# Patient Record
Sex: FEMALE | Race: WHITE | NOT HISPANIC OR LATINO | Employment: FULL TIME | URBAN - METROPOLITAN AREA
[De-identification: names, ages, dates, MRNs, and addresses within clinical notes are randomized per-mention and may not be internally consistent; named-entity substitution may affect disease eponyms.]

---

## 2024-10-12 ENCOUNTER — HOSPITAL ENCOUNTER (EMERGENCY)
Facility: HOSPITAL | Age: 49
Discharge: HOME | End: 2024-10-12
Attending: EMERGENCY MEDICINE
Payer: COMMERCIAL

## 2024-10-12 ENCOUNTER — APPOINTMENT (OUTPATIENT)
Dept: GENERAL RADIOLOGY | Facility: HOSPITAL | Age: 49
End: 2024-10-12
Payer: COMMERCIAL

## 2024-10-12 VITALS
SYSTOLIC BLOOD PRESSURE: 119 MMHG | HEART RATE: 93 BPM | WEIGHT: 195 LBS | BODY MASS INDEX: 33.29 KG/M2 | OXYGEN SATURATION: 98 % | DIASTOLIC BLOOD PRESSURE: 108 MMHG | TEMPERATURE: 97.9 F | HEIGHT: 64 IN | RESPIRATION RATE: 22 BRPM

## 2024-10-12 DIAGNOSIS — Z86.79 HISTORY OF HYPERTENSION: ICD-10-CM

## 2024-10-12 DIAGNOSIS — I47.10 PAROXYSMAL SVT (SUPRAVENTRICULAR TACHYCARDIA): Primary | ICD-10-CM

## 2024-10-12 DIAGNOSIS — E11.65 TYPE 2 DIABETES MELLITUS WITH HYPERGLYCEMIA, UNSPECIFIED WHETHER LONG TERM INSULIN USE: ICD-10-CM

## 2024-10-12 LAB
ALBUMIN SERPL-MCNC: 4.5 G/DL (ref 3.5–5.2)
ALBUMIN/GLOB SERPL: 1.4 G/DL
ALP SERPL-CCNC: 97 U/L (ref 39–117)
ALT SERPL W P-5'-P-CCNC: 31 U/L (ref 1–33)
ANION GAP SERPL CALCULATED.3IONS-SCNC: 18 MMOL/L (ref 5–15)
AST SERPL-CCNC: 27 U/L (ref 1–32)
BASOPHILS # BLD AUTO: 0.04 10*3/MM3 (ref 0–0.2)
BASOPHILS NFR BLD AUTO: 0.2 % (ref 0–1.5)
BILIRUB SERPL-MCNC: 0.3 MG/DL (ref 0–1.2)
BUN SERPL-MCNC: 14 MG/DL (ref 6–20)
BUN/CREAT SERPL: 16.9 (ref 7–25)
CALCIUM SPEC-SCNC: 9.5 MG/DL (ref 8.6–10.5)
CHLORIDE SERPL-SCNC: 104 MMOL/L (ref 98–107)
CO2 SERPL-SCNC: 23 MMOL/L (ref 22–29)
CREAT SERPL-MCNC: 0.83 MG/DL (ref 0.57–1)
DEPRECATED RDW RBC AUTO: 40.3 FL (ref 37–54)
EGFRCR SERPLBLD CKD-EPI 2021: 86.5 ML/MIN/1.73
EOSINOPHIL # BLD AUTO: 0.09 10*3/MM3 (ref 0–0.4)
EOSINOPHIL NFR BLD AUTO: 0.5 % (ref 0.3–6.2)
ERYTHROCYTE [DISTWIDTH] IN BLOOD BY AUTOMATED COUNT: 11.9 % (ref 12.3–15.4)
GLOBULIN UR ELPH-MCNC: 3.3 GM/DL
GLUCOSE SERPL-MCNC: 166 MG/DL (ref 65–99)
HCT VFR BLD AUTO: 46.3 % (ref 34–46.6)
HGB BLD-MCNC: 15.5 G/DL (ref 12–15.9)
HOLD SPECIMEN: NORMAL
IMM GRANULOCYTES # BLD AUTO: 0.06 10*3/MM3 (ref 0–0.05)
IMM GRANULOCYTES NFR BLD AUTO: 0.3 % (ref 0–0.5)
LIPASE SERPL-CCNC: 40 U/L (ref 13–60)
LYMPHOCYTES # BLD AUTO: 3.57 10*3/MM3 (ref 0.7–3.1)
LYMPHOCYTES NFR BLD AUTO: 20.6 % (ref 19.6–45.3)
MCH RBC QN AUTO: 30.8 PG (ref 26.6–33)
MCHC RBC AUTO-ENTMCNC: 33.5 G/DL (ref 31.5–35.7)
MCV RBC AUTO: 91.9 FL (ref 79–97)
MONOCYTES # BLD AUTO: 0.96 10*3/MM3 (ref 0.1–0.9)
MONOCYTES NFR BLD AUTO: 5.5 % (ref 5–12)
NEUTROPHILS NFR BLD AUTO: 12.63 10*3/MM3 (ref 1.7–7)
NEUTROPHILS NFR BLD AUTO: 72.9 % (ref 42.7–76)
NRBC BLD AUTO-RTO: 0 /100 WBC (ref 0–0.2)
NT-PROBNP SERPL-MCNC: 79.5 PG/ML (ref 0–450)
PLATELET # BLD AUTO: 335 10*3/MM3 (ref 140–450)
PMV BLD AUTO: 8.7 FL (ref 6–12)
POTASSIUM SERPL-SCNC: 4.2 MMOL/L (ref 3.5–5.2)
PROT SERPL-MCNC: 7.8 G/DL (ref 6–8.5)
RBC # BLD AUTO: 5.04 10*6/MM3 (ref 3.77–5.28)
SODIUM SERPL-SCNC: 145 MMOL/L (ref 136–145)
TROPONIN T SERPL HS-MCNC: 9 NG/L
WBC NRBC COR # BLD AUTO: 17.35 10*3/MM3 (ref 3.4–10.8)
WHOLE BLOOD HOLD COAG: NORMAL
WHOLE BLOOD HOLD SPECIMEN: NORMAL

## 2024-10-12 PROCEDURE — 80053 COMPREHEN METABOLIC PANEL: CPT | Performed by: EMERGENCY MEDICINE

## 2024-10-12 PROCEDURE — 99285 EMERGENCY DEPT VISIT HI MDM: CPT

## 2024-10-12 PROCEDURE — 93005 ELECTROCARDIOGRAM TRACING: CPT | Performed by: EMERGENCY MEDICINE

## 2024-10-12 PROCEDURE — 84484 ASSAY OF TROPONIN QUANT: CPT | Performed by: EMERGENCY MEDICINE

## 2024-10-12 PROCEDURE — 71045 X-RAY EXAM CHEST 1 VIEW: CPT

## 2024-10-12 PROCEDURE — 83690 ASSAY OF LIPASE: CPT | Performed by: EMERGENCY MEDICINE

## 2024-10-12 PROCEDURE — 96374 THER/PROPH/DIAG INJ IV PUSH: CPT

## 2024-10-12 PROCEDURE — 85025 COMPLETE CBC W/AUTO DIFF WBC: CPT | Performed by: EMERGENCY MEDICINE

## 2024-10-12 PROCEDURE — 83880 ASSAY OF NATRIURETIC PEPTIDE: CPT | Performed by: EMERGENCY MEDICINE

## 2024-10-12 RX ORDER — DILTIAZEM HYDROCHLORIDE 5 MG/ML
20 INJECTION INTRAVENOUS ONCE
Status: COMPLETED | OUTPATIENT
Start: 2024-10-12 | End: 2024-10-12

## 2024-10-12 RX ORDER — DILTIAZEM HYDROCHLORIDE 5 MG/ML
INJECTION INTRAVENOUS
Status: COMPLETED
Start: 2024-10-12 | End: 2024-10-12

## 2024-10-12 RX ORDER — ASPIRIN 81 MG/1
324 TABLET, CHEWABLE ORAL ONCE
Status: DISCONTINUED | OUTPATIENT
Start: 2024-10-12 | End: 2024-10-12 | Stop reason: HOSPADM

## 2024-10-12 RX ORDER — SODIUM CHLORIDE 0.9 % (FLUSH) 0.9 %
10 SYRINGE (ML) INJECTION AS NEEDED
Status: DISCONTINUED | OUTPATIENT
Start: 2024-10-12 | End: 2024-10-12 | Stop reason: HOSPADM

## 2024-10-12 RX ADMIN — DILTIAZEM HYDROCHLORIDE 25 MG: 5 INJECTION INTRAVENOUS at 17:33

## 2024-10-12 RX ADMIN — DILTIAZEM HYDROCHLORIDE 25 MG: 5 INJECTION, SOLUTION INTRAVENOUS at 17:33

## 2024-10-12 NOTE — ED PROVIDER NOTES
Subjective   History of Present Illness  Patient is a 49-year-old female presenting to the emergency department with palpitation and chest tightness starting around 2 PM today.  Patient does report she had an episode of SVT in the past with pregnancy.  Patient states she has been on metoprolol since that time.  Patient reports the symptoms started suddenly around 2 PM today.  Patient denies any history of any other cardiac disease.    History provided by:  Patient and relative      Review of Systems    Past Medical History:   Diagnosis Date    Diabetes mellitus     Hypertension        No Known Allergies    Past Surgical History:   Procedure Laterality Date    UTERINE FIBROID SURGERY         No family history on file.    Social History     Socioeconomic History    Marital status:    Tobacco Use    Smoking status: Never   Substance and Sexual Activity    Alcohol use: No    Drug use: No    Sexual activity: Defer           Objective   Physical Exam  Vitals and nursing note reviewed.   Constitutional:       Appearance: She is well-developed. She is ill-appearing and diaphoretic.   Cardiovascular:      Rate and Rhythm: Regular rhythm. Tachycardia present.   Pulmonary:      Effort: Pulmonary effort is normal. No respiratory distress.   Neurological:      Mental Status: She is alert and oriented to person, place, and time.   Psychiatric:         Mood and Affect: Mood normal.         Behavior: Behavior normal.         Chemical Cardioversion    Date/Time: 10/12/2024 5:38 PM    Performed by: Kavon Wilburn MD  Authorized by: Kavon Wilburn MD    Consent:     Consent obtained:  Emergent situation and verbal    Consent given by:  Patient    Risks discussed:  Death, induced arrhythmia and pain    Alternatives discussed:  Alternative treatment  Pre-procedure details:     Cardioversion basis:  Emergent    Rhythm:  Supraventricular tachycardia  Attempt one:     : diltiazem.      Medication outcome:  Conversion  to normal sinus rhythm: 20mg.  Post-procedure details:     Patient status:  Awake    Patient tolerance of procedure:  Tolerated well, no immediate complications  Critical Care    Performed by: Kavon Wilburn MD  Authorized by: Kavon Wilburn MD    Critical care provider statement:     Critical care time (minutes):  35    Critical care was necessary to treat or prevent imminent or life-threatening deterioration of the following conditions:  Cardiac failure and circulatory failure    Critical care was time spent personally by me on the following activities:  Evaluation of patient's response to treatment, examination of patient, obtaining history from patient or surrogate, ordering and performing treatments and interventions, ordering and review of laboratory studies, ordering and review of radiographic studies and re-evaluation of patient's condition             ED Course  ED Course as of 10/12/24 2352   Sat Oct 12, 2024   1730 Heart Rate(!): 200  Triage heart rate of 200 which is confirmed by the initial EKG which demonstrates an SVT at approximately 198 bpm. [RS]   1737 Successful chemical cardioversion with 20 mg of diltiazem.  See procedure note for details. [RS]   1740 Patient reports feeling much better.  She is resting comfortably. [RS]   1744 WBC(!): 17.35  Leukocytosis noted no likely representing stress response. [RS]   1829 XR Chest 1 View  Personally reviewed single view the chest.  My interpretation there is no focal lobar infiltrate visualized. [RS]   1833 High Sensitivity Troponin T  Negative troponin [RS]   1834 Patient presents with SVT which was resolved with chemical cardioversion with diltiazem.  Patient is tolerated well.  She is resting comfortably.  No overt distress.  Will discharge the patient and have her follow-up with the rhythm clinic for further evaluation and management. [RS]   1834   No evidence of acute coronary syndrome.  No evidence of persistent arrhythmia.  Patient  voiced understanding and agrees. I have reviewed results, considerations, and diagnosis with the patient and/or their representative. Anticipatory guidance provided. Follow-up plan reviewed. Precautions for acute return for re-evaluations also reviewed. This including potential for worsening of the presenting condition and need for further evaluation, admission, and/or intervention as indicated. Opportunity to as questions provided. I advised them to return for any concerns and stressed the importance of timely follow-up and outpatient services. They verbalized understanding.   [RS]   1834 Note to patient: The 21st Century Cures Act makes medical notes like these available to patients in the interest of transparency. However, be advised this is a medical document. It is intended as peer to peer communication. It is written in medical language and may contain abbreviations or verbiage that are unfamiliar. It may appear blunt or direct. Medical documents are intended to carry relevant information, facts as evident, and the clinical opinion of the physician/NPP.   [RS]      ED Course User Index  [RS] Kavon Wilburn MD                                             Medical Decision Making  Problems Addressed:  History of hypertension: complicated acute illness or injury  Paroxysmal SVT (supraventricular tachycardia): complicated acute illness or injury  Type 2 diabetes mellitus with hyperglycemia, unspecified whether long term insulin use: complicated acute illness or injury    Amount and/or Complexity of Data Reviewed  Labs: ordered. Decision-making details documented in ED Course.  Radiology: ordered. Decision-making details documented in ED Course.  ECG/medicine tests: ordered.    Risk  Prescription drug management.    Critical Care  Total time providing critical care: 35 minutes        Final diagnoses:   Paroxysmal SVT (supraventricular tachycardia)   History of hypertension   Type 2 diabetes mellitus with  hyperglycemia, unspecified whether long term insulin use       ED Disposition  ED Disposition       ED Disposition   Discharge    Condition   Stable    Comment   --               [facility] CARDIOLOGY  [address]  [address]  [place] [address] [phone]    As soon as possible.    Joanne Bangura MD  [address] [address] [phone]          [facility] EMERGENCY DEPARTMENT  [address] [address] [phone]    As needed, If symptoms worsen or ANY concerns.         Medication List      No changes were made to your prescriptions during this visit.            Kavon Wilburn MD  10/12/24 1462

## 2024-10-13 LAB
QT INTERVAL: 232 MS
QTC INTERVAL: 421 MS

## 2024-10-17 ENCOUNTER — LAB (OUTPATIENT)
Dept: LAB | Facility: HOSPITAL | Age: 49
End: 2024-10-17
Payer: COMMERCIAL

## 2024-10-17 ENCOUNTER — HOSPITAL ENCOUNTER (OUTPATIENT)
Dept: CARDIOLOGY | Facility: HOSPITAL | Age: 49
Discharge: HOME | End: 2024-10-17
Payer: COMMERCIAL

## 2024-10-17 ENCOUNTER — OFFICE VISIT (OUTPATIENT)
Dept: CARDIOLOGY | Facility: HOSPITAL | Age: 49
End: 2024-10-17
Payer: COMMERCIAL

## 2024-10-17 VITALS
BODY MASS INDEX: 33.19 KG/M2 | RESPIRATION RATE: 18 BRPM | HEIGHT: 64 IN | HEART RATE: 70 BPM | DIASTOLIC BLOOD PRESSURE: 81 MMHG | SYSTOLIC BLOOD PRESSURE: 138 MMHG | WEIGHT: 194.38 LBS | OXYGEN SATURATION: 97 %

## 2024-10-17 DIAGNOSIS — I47.10 PAROXYSMAL SVT (SUPRAVENTRICULAR TACHYCARDIA): ICD-10-CM

## 2024-10-17 DIAGNOSIS — I47.10 PAROXYSMAL SVT (SUPRAVENTRICULAR TACHYCARDIA): Primary | ICD-10-CM

## 2024-10-17 LAB
MAGNESIUM SERPL-MCNC: 2.5 MG/DL (ref 1.6–2.6)
QT INTERVAL: 404 MS
QTC INTERVAL: 445 MS

## 2024-10-17 PROCEDURE — 83735 ASSAY OF MAGNESIUM: CPT

## 2024-10-17 PROCEDURE — 93005 ELECTROCARDIOGRAM TRACING: CPT | Performed by: NURSE PRACTITIONER

## 2024-10-17 PROCEDURE — 93246 EXT ECG>7D<15D RECORDING: CPT

## 2024-10-17 PROCEDURE — 36415 COLL VENOUS BLD VENIPUNCTURE: CPT

## 2024-10-17 RX ORDER — DULAGLUTIDE 0.75 MG/.5ML
INJECTION, SOLUTION SUBCUTANEOUS
COMMUNITY

## 2024-10-17 RX ORDER — METOPROLOL SUCCINATE 50 MG/1
50 TABLET, EXTENDED RELEASE ORAL DAILY
Qty: 90 TABLET | Refills: 1 | Status: SHIPPED | OUTPATIENT
Start: 2024-10-17

## 2024-10-17 RX ORDER — CITALOPRAM HYDROBROMIDE 20 MG/1
20 TABLET ORAL DAILY
COMMUNITY

## 2024-10-17 RX ORDER — BUPROPION HYDROCHLORIDE 300 MG/1
300 TABLET ORAL EVERY MORNING
COMMUNITY

## 2024-10-17 NOTE — PROGRESS NOTES
"Chief Complaint  Establish Care and Rapid Heart Rate (svt)    Subjective      History of Present Illness {CC  Problem List  Visit  Diagnosis   Encounters  Notes  Medications  Labs  Result Review Imaging  Media :23}     Rabia EISENBERG, 49 y.o. female with paroxysmal SVT presents to [facility] for Establish Care and Rapid Heart Rate (svt).    Patient recently evaluated at [facility] emergency department and found to have SVT; successful conversion to NSR with diltiazem.     Presents today with no recurrent arrhythmia symptoms since ED evaluation. Prior symptoms of SVT with rapid heartbeat, flushed sensation. Total duration of SVT approximately 3 hours. Reports prior episode of SVT during childbirth at the age of 31yo. Previously maintained on metoprolol tartrate 25mg every morning; one episode last year with flushed sensation and rapid heartbeat that lasted one hour. No recent holter and TTE, possibly completed during original episode 19y ago. No recent illness or medication change. Minimal caffeine, rare alcohol once per month. No thyroid dysfunction, no sleep apnea symptoms.       Objective     Vital Signs:   Vitals:    10/17/24 0926 10/17/24 0928   BP: 146/82 138/81   BP Location: Left arm Left arm   Patient Position: Standing Sitting   Cuff Size: Adult Adult   Pulse: 69 70   Resp:  18   SpO2: 97% 97%   Weight:  88.2 kg (194 lb 6 oz)   Height:  162.6 cm (64\")     Body mass index is 33.36 kg/m².  Physical Exam  Vitals and nursing note reviewed.   Constitutional:       Appearance: Normal appearance.   HENT:      Head: Normocephalic.   Eyes:      Extraocular Movements: Extraocular movements intact.   Neck:      Vascular: No carotid bruit.   Cardiovascular:      Rate and Rhythm: Normal rate and regular rhythm.      Pulses: Normal pulses.      Heart sounds: Normal heart sounds, S1 normal and S2 normal. No murmur heard.  Pulmonary:      Effort: Pulmonary effort is normal. " No respiratory distress.      Breath sounds: Normal breath sounds.   Musculoskeletal:      Cervical back: Neck supple.      Right lower leg: No edema.      Left lower leg: No edema.   Skin:     General: Skin is warm and dry.   Neurological:      General: No focal deficit present.      Mental Status: She is alert.   Psychiatric:         Mood and Affect: Mood normal.         Behavior: Behavior normal.         Thought Content: Thought content normal.        Data Reviewed:{ Labs  Result Review  Imaging  Med Tab  Media :23}     BNP (10/12/2024 17:34)  Comprehensive Metabolic Panel (10/12/2024 17:34)  CBC & Differential (10/12/2024 17:34)  High Sensitivity Troponin T (10/12/2024 17:34)  XR Chest 1 View (10/12/2024 18:02)   ECG 12 Lead ED Triage Standing Order; Chest Pain (10/12/2024 17:26)   TSH (09/04/2024 11:10)  T4, FREE (09/04/2024 11:10)      Assessment & Plan   Assessment and Plan {CC Problem List  Visit Diagnosis  ROS  Review (Popup)  Health Maintenance  Quality  BestPractice  Medications  SmartSets  SnapShot Encounters  Media :23}       1. Paroxysmal SVT (supraventricular tachycardia)  -Recent recurrent SVT requiring ED evaluation and medical cardioversion. Total duration of SVT approximately 3 hours. Reports prior episode of SVT during childbirth at the age of 31yo.   - ECG today with normal sinus rhythm  -No recurrent SVT symptoms since ED evaluation  -Given recurrent SVT will increase Toprol-XL to 25 Mg daily  -TTE for structural/functional evaluation  -14-day Holter monitor for arrhythmia surveillance prior to establishing electrophysiology  -Discussed SVT treatment options, interested in pursuing potential ablation given her recurrent episodes  -Magnesium level not drawn in ED, will check today  - Ambulatory Referral to Cardiac Electrophysiology      Follow Up {Instructions Charge Capture  Follow-up Communications :23}     Return if symptoms worsen or fail to improve.      Patient was given  instructions and counseling regarding her condition or for health maintenance advice. Please see specific information pulled into the AVS if appropriate.  Patient was instructed to call the Heart [facility] Center with any questions, concerns, or worsening symptoms.    Dictated Utilizing Dragon Dictation   Please note that portions of this note were completed with a voice recognition program.   Part of this note may be an electronic transcription/translation of spoken language to printed text using the Dragon Dictation System.

## 2024-10-17 NOTE — PROGRESS NOTES
[facility] Heart Monitor Documentation    Rabia EISENBERG  1975  [id number]  10/17/24      [] ZIO XT Patch  Model [id number] Prescribed for 14 Days    Serial Number: (N + 9 Digits) [id number]  Apply-By Date on Box: 132715  USPS Tracking Number: [id number]  USPS Tracking        [] [employer] MINI PLUS Mobile Cardiac Telemetry  Model BGMINIPLUS Prescribed for N/A Days    Serial Number: (BGM + 7 Digits) BGM  Shipped-By Date on Box:   UPS Tracking Number: [id number]  UPS Tracking      [] [employer] MINI Holter Monitor  Model BGMINIEL Prescribed for N/A Days    Serial Number: (7 Digits)   Shipped-By Date on Box:   UPS Tracking Number: [id number]  UPS Tracking        This monitor was applied to the patient's chest and checked for proper functioning.  Ms. Rabia EISENBERG was instructed in the proper use of this monitor.  She was given the opportunity to ask questions and left the office with the device 's instruction manual.    Jazmine Goode MA, 10:08 EDT, 10/17/24                  [facility] 8.8.2019

## 2024-10-17 NOTE — PATIENT INSTRUCTIONS
- Lab work on 7th floor  - Heart monitor for 2 weeks   - Office will schedule testing  - Office will call or send message with testing results  - Electrophysiology department will call for appointment

## 2024-10-23 ENCOUNTER — HOSPITAL ENCOUNTER (OUTPATIENT)
Dept: CARDIOLOGY | Facility: HOSPITAL | Age: 49
Discharge: HOME | End: 2024-10-23
Admitting: NURSE PRACTITIONER
Payer: COMMERCIAL

## 2024-10-23 DIAGNOSIS — I47.10 PAROXYSMAL SVT (SUPRAVENTRICULAR TACHYCARDIA): ICD-10-CM

## 2024-10-23 LAB
BH CV ECHO MEAS - AO MAX PG: 4.7 MMHG
BH CV ECHO MEAS - AO MEAN PG: 2 MMHG
BH CV ECHO MEAS - AO ROOT DIAM: 2.45 CM
BH CV ECHO MEAS - AO V2 MAX: 108 CM/SEC
BH CV ECHO MEAS - AO V2 VTI: 19.1 CM
BH CV ECHO MEAS - AVA(I,D): 2.32 CM2
BH CV ECHO MEAS - EDV(CUBED): 64 ML
BH CV ECHO MEAS - EDV(MOD-SP2): 44.2 ML
BH CV ECHO MEAS - EDV(MOD-SP4): 60.5 ML
BH CV ECHO MEAS - EF(MOD-BP): 61.9 %
BH CV ECHO MEAS - EF(MOD-SP2): 58.1 %
BH CV ECHO MEAS - EF(MOD-SP4): 61.3 %
BH CV ECHO MEAS - ESV(CUBED): 15.9 ML
BH CV ECHO MEAS - ESV(MOD-SP2): 18.5 ML
BH CV ECHO MEAS - ESV(MOD-SP4): 23.4 ML
BH CV ECHO MEAS - FS: 37.1 %
BH CV ECHO MEAS - IVS/LVPW: 1 CM
BH CV ECHO MEAS - IVSD: 0.9 CM
BH CV ECHO MEAS - LA DIMENSION: 3.5 CM
BH CV ECHO MEAS - LAT PEAK E' VEL: 8.9 CM/SEC
BH CV ECHO MEAS - LV MASS(C)D: 109.7 GRAMS
BH CV ECHO MEAS - LV MAX PG: 2.5 MMHG
BH CV ECHO MEAS - LV MEAN PG: 1 MMHG
BH CV ECHO MEAS - LV V1 MAX: 79.4 CM/SEC
BH CV ECHO MEAS - LV V1 VTI: 14.4 CM
BH CV ECHO MEAS - LVIDD: 4 CM
BH CV ECHO MEAS - LVIDS: 2.5 CM
BH CV ECHO MEAS - LVOT AREA: 3.1 CM2
BH CV ECHO MEAS - LVOT DIAM: 1.98 CM
BH CV ECHO MEAS - LVPWD: 0.9 CM
BH CV ECHO MEAS - MED PEAK E' VEL: 9 CM/SEC
BH CV ECHO MEAS - MV A MAX VEL: 90 CM/SEC
BH CV ECHO MEAS - MV DEC SLOPE: 535.1 CM/SEC2
BH CV ECHO MEAS - MV DEC TIME: 0.19 SEC
BH CV ECHO MEAS - MV E MAX VEL: 90 CM/SEC
BH CV ECHO MEAS - MV E/A: 1
BH CV ECHO MEAS - MV MAX PG: 4.3 MMHG
BH CV ECHO MEAS - MV MEAN PG: 1.53 MMHG
BH CV ECHO MEAS - MV P1/2T: 57.9 MSEC
BH CV ECHO MEAS - MV V2 VTI: 29 CM
BH CV ECHO MEAS - MVA(P1/2T): 3.8 CM2
BH CV ECHO MEAS - MVA(VTI): 1.53 CM2
BH CV ECHO MEAS - PA ACC TIME: 0.13 SEC
BH CV ECHO MEAS - PI END-D VEL: 88.2 CM/SEC
BH CV ECHO MEAS - RAP SYSTOLE: 3 MMHG
BH CV ECHO MEAS - RVSP: 17.1 MMHG
BH CV ECHO MEAS - SV(LVOT): 44.3 ML
BH CV ECHO MEAS - SV(MOD-SP2): 25.7 ML
BH CV ECHO MEAS - SV(MOD-SP4): 37.1 ML
BH CV ECHO MEAS - TAPSE (>1.6): 2.46 CM
BH CV ECHO MEAS - TR MAX PG: 14.1 MMHG
BH CV ECHO MEAS - TR MAX VEL: 185.3 CM/SEC
BH CV ECHO MEASUREMENTS AVERAGE E/E' RATIO: 10.06
BH CV XLRA - RV BASE: 3.2 CM
BH CV XLRA - RV LENGTH: 5.4 CM
BH CV XLRA - RV MID: 2.8 CM
BH CV XLRA - TDI S': 12.7 CM/SEC
LEFT ATRIUM VOLUME INDEX: 8.6 ML/M2

## 2024-10-23 PROCEDURE — 93306 TTE W/DOPPLER COMPLETE: CPT

## 2024-11-14 ENCOUNTER — TELEPHONE (OUTPATIENT)
Dept: CARDIOLOGY | Facility: HOSPITAL | Age: 49
End: 2024-11-14
Payer: COMMERCIAL

## 2024-11-14 NOTE — TELEPHONE ENCOUNTER
Jamalo  Notification for VTach  bpm, last 9.2 seconds report on page 11, waiting for report to post will bring copy to you for review.

## 2024-11-18 LAB
CV ZIO BASELINE AVG BPM: 80 BPM
CV ZIO BASELINE BPM HIGH: 124 BPM
CV ZIO BASELINE BPM LOW: 58 BPM
CV ZIO DEVICE ANALYSIS TIME: NORMAL
CV ZIO ECT SVE COUNT: 951 EPISODES
CV ZIO ECT SVE CPLT COUNT: 78 EPISODES
CV ZIO ECT SVE CPLT FREQ: NORMAL
CV ZIO ECT SVE FREQ: NORMAL
CV ZIO ECT SVE TPLT COUNT: 14 EPISODES
CV ZIO ECT SVE TPLT FREQ: NORMAL
CV ZIO ECT VE COUNT: 13 EPISODES
CV ZIO ECT VE CPLT COUNT: 0 EPISODES
CV ZIO ECT VE CPLT FREQ: 0
CV ZIO ECT VE FREQ: NORMAL
CV ZIO ECT VE TPLT COUNT: 0 EPISODES
CV ZIO ECT VE TPLT FREQ: 0
CV ZIO ECTOPIC SVE COUPLET RAW PERCENT: 0.01 %
CV ZIO ECTOPIC SVE ISOLATED PERCENT: 0.06 %
CV ZIO ECTOPIC SVE TRIPLET RAW PERCENT: 0 %
CV ZIO ECTOPIC VE COUPLET RAW PERCENT: 0 %
CV ZIO ECTOPIC VE ISOLATED PERCENT: 0 %
CV ZIO ECTOPIC VE TRIPLET RAW PERCENT: 0 %
CV ZIO ENROLLMENT END: NORMAL
CV ZIO ENROLLMENT START: NORMAL
CV ZIO PATIENT EVENTS DIARIES: 0
CV ZIO PATIENT EVENTS TRIGGERS: 1
CV ZIO PAUSE COUNT: 0
CV ZIO PRESCRIPTION STATUS: NORMAL
CV ZIO SVT AVG BPM: 113 BPM
CV ZIO SVT BPM HIGH: 124 BPM
CV ZIO SVT BPM LOW: 103 BPM
CV ZIO SVT COUNT: 4
CV ZIO SVT F EPI AVG BPM: 119 BPM
CV ZIO SVT F EPI BEATS: 4 BEATS
CV ZIO SVT F EPI BPM HIGH: 124 BPM
CV ZIO SVT F EPI BPM LOW: 111 BPM
CV ZIO SVT F EPI DUR: 2.1 SEC
CV ZIO SVT F EPI END: NORMAL
CV ZIO SVT F EPI START: NORMAL
CV ZIO SVT L EPI AVG BPM: 110 BPM
CV ZIO SVT L EPI BEATS: 5 BEATS
CV ZIO SVT L EPI BPM HIGH: 118 BPM
CV ZIO SVT L EPI BPM LOW: 103 BPM
CV ZIO SVT L EPI DUR: 3 SEC
CV ZIO SVT L EPI END: NORMAL
CV ZIO SVT L EPI START: NORMAL
CV ZIO TOTAL  ENROLLMENT PERIOD: NORMAL
CV ZIO VT AVG BPM: 100 BPM
CV ZIO VT BPM HIGH: 109 BPM
CV ZIO VT BPM LOW: 84 BPM
CV ZIO VT COUNT: 1
CV ZIO VT F EPI AVG BPM: 100
CV ZIO VT F EPI BEATS: 16 BEATS
CV ZIO VT F EPI BPM HIGH: 109
CV ZIO VT F EPI BPM LOW: 84
CV ZIO VT F EPI DUR: 9.6 SEC
CV ZIO VT F EPI END: NORMAL
CV ZIO VT F EPI START: NORMAL
CV ZIO VT L EPI AVG BPM: 100
CV ZIO VT L EPI BEATS: 16 BEATS
CV ZIO VT L EPI BPM HIGH: 109 BPM
CV ZIO VT L EPI BPM LOW: 84 BPM
CV ZIO VT L EPI DUR: 9.6
CV ZIO VT L EPI END: NORMAL
CV ZIO VT L EPI START: NORMAL

## 2025-01-14 PROBLEM — I47.10 PAROXYSMAL SVT (SUPRAVENTRICULAR TACHYCARDIA): Status: ACTIVE | Noted: 2025-01-14

## 2025-01-14 PROBLEM — I10 ESSENTIAL HYPERTENSION: Status: ACTIVE | Noted: 2025-01-14

## 2025-01-14 NOTE — PROGRESS NOTES
"Electrophysiology Clinic Consult     Rabia Forte  [id number]  1975    Referring Provider: Benito Anaya APRN   PCP: Joanne Bangura MD  [address] [place] [address] / [place] [id number]    Date of Service: 01/16/25    Chief Complaint   Patient presents with    Paroxysmal SVT (supraventricular tachycardia)     NP     Problem List  SVT  Diagnosed during pregnancy in 2005  ED visit, 10/12/24:  Conversion with IV Diltiazem   Monitor, 10/17/24: predominantly SR - one episode of WCT (around 100bpm), which terminates with sinus pause - short episodes of NCT which appears to be likely SVT - symptoms during SR   Echo, 10/23/24: EF 61-65%, no significant valvular disease  Hypertension  Diabetes      Past Medical History:   Diagnosis Date    Cancer 1999    Cervical    Diabetes mellitus     Heart valve disease 2005    Hypertension        History of Present Illness  Rabia Forte is a 49 y.o. female who presents to my electrophysiology clinic for evaluation of SVT. Diagnosed about 20 years ago when she was pregnant with her daughter- was told that she had SVT at that time and has been having intermittent episodes that were either self terminating or responding to vagal maneuver/\"lying down on her stomach\". She was also on metoprolol 25 at that time. Her last event which was on 10/12/2024, she notes it as her worst episode and she came to ER because it didn't respond to vagal maneuver. Endorses symptoms of palpitation, flushing, and lightheadedness. Was started on IV diltiazem and she converted. Since then she was up titrated on metoprolol to 50. No further recurrence and EVELIA was unremarkable for SVT.     Review of Systems   Constitutional:  Negative for activity change, fatigue and fever.   Respiratory:  Positive for shortness of breath. Negative for chest tightness.    Cardiovascular:  Positive for palpitations. Negative for chest pain and leg swelling.   Gastrointestinal:  Negative for constipation and " "diarrhea.   Genitourinary:  Negative for decreased urine volume and difficulty urinating.   Skin:  Negative for wound.   Neurological:  Positive for light-headedness. Negative for dizziness, syncope and weakness.   Psychiatric/Behavioral:  Negative for suicidal ideas.        Outpatient Medications Marked as Taking for the 1/16/25 encounter (Office Visit) with Jaison Christopher MD   Medication Sig Dispense Refill    buPROPion XL (WELLBUTRIN XL) 300 MG 24 hr tablet Take 1 tablet by mouth Every Morning.      citalopram (CeleXA) 20 MG tablet Take 1 tablet by mouth Daily.      metoprolol succinate XL (TOPROL-XL) 50 MG 24 hr tablet Take 1 tablet by mouth Daily. 90 tablet 1    Trulicity 0.75 MG/0.5ML solution auto-injector INJECT 1 SYRINGE (0.75 MG) SUBCUTANEOUSLY ONCE A WEEK         Physical Exam  Vitals:    01/16/25 0912   BP: 124/80   BP Location: Left arm   Patient Position: Sitting   Cuff Size: Adult   Pulse: 77   SpO2: 96%   Weight: 88 kg (194 lb)   Height: 162.6 cm (64\")     Body mass index is 33.3 kg/m².    Vitals and nursing note reviewed.   Constitutional:       Appearance: Healthy appearance.   HENT:      Head: Normocephalic and atraumatic.      Nose: Nose normal.   Neck:      Vascular: No JVD.   Pulmonary:      Effort: Pulmonary effort is normal.      Breath sounds: Normal breath sounds.   Cardiovascular:      PMI at left midclavicular line. Normal rate. Regular rhythm. Normal S1. Normal S2.       Murmurs: There is no murmur.      No gallop.    Edema:     Peripheral edema absent.   Skin:     General: Skin is warm and dry.   Neurological:      Mental Status: Oriented to person, place and time.   Psychiatric:         Behavior: Behavior normal.          Diagnostic Data    ECG 12 Lead    Date/Time: 1/16/2025 9:33 AM  Performed by: Jaison Christopher MD    Authorized by: Jaison Christopher MD  Comparison: not compared with previous ECG   Rhythm: sinus rhythm  Rate: normal  Conduction: conduction normal  QRS axis: normal  Other: no " "other findings    Clinical impression: normal ECG          Lab Results   Component Value Date    GLUCOSE 166 (H) 10/12/2024    CALCIUM 9.5 10/12/2024     10/12/2024    K 4.2 10/12/2024    CO2 23.0 10/12/2024     10/12/2024    BUN 14 10/12/2024    CREATININE 0.83 10/12/2024    EGFRIFAFRI >60 12/09/2021    EGFRIFNONA >60 12/09/2021    BCR 16.9 10/12/2024    ANIONGAP 18.0 (H) 10/12/2024     Lab Results   Component Value Date    WBC 17.35 (H) 10/12/2024    HGB 15.5 10/12/2024    HCT 46.3 10/12/2024    MCV 91.9 10/12/2024     10/12/2024     No results found for: \"INR\", \"PROTIME\"  No results found for: \"TSH\", \"J4ESIHJ\", \"B4AGMBV\", \"THYROIDAB\"      I personally viewed and interpreted the patient's EKG/Telemetry/lab data    Rabia Nelsonjose  reports that she has never smoked. She has been exposed to tobacco smoke. She has never used smokeless tobacco. I have educated her on the risk of diseases from using tobacco products such as cancer, COPD, and heart disease.       I spent 3  minutes counseling the patient.           ACP discussion was declined by the patient. Patient does not have an advance directive, declines further assistance.          Assessment and Plan   Diagnoses and all orders for this visit:    1. Paroxysmal SVT (supraventricular tachycardia) (Primary)    2. Essential hypertension    Other orders  -     ECG 12 Lead        SVT  -ED visit, 10/12/24:  conversion IV cardizem   - short RP tachycardia  -Monitor, 10/17/24: predominantly SR - one episode of WCT (around 100bpm), which terminates with sinus pause - short episodes of NCT which appears to be likely SVT   -Echo, 10/23/24: EF 61-65%, no significant valvular disease  -we had extensive discussion re: her SVT and management options including continuing metoprolol vs EPS +/- SVT ablation   - for now she would like to defer invasive option and continue on metoprolol      Hypertension  -Well controlled, continue current medication " regimen      Follow Up  Return in about 1 year (around 1/16/2026).      Thank you for allowing me to participate in the care of your patient. Please to not hesitate to contact me with additional questions or concerns.        Jaison Christopher MD [facility]  Cardiac Electrophysiologist  [facility] / [facility]

## 2025-01-16 ENCOUNTER — OFFICE VISIT (OUTPATIENT)
Dept: CARDIOLOGY | Facility: CLINIC | Age: 50
End: 2025-01-16
Payer: COMMERCIAL

## 2025-01-16 VITALS
HEIGHT: 64 IN | SYSTOLIC BLOOD PRESSURE: 124 MMHG | DIASTOLIC BLOOD PRESSURE: 80 MMHG | WEIGHT: 194 LBS | BODY MASS INDEX: 33.12 KG/M2 | OXYGEN SATURATION: 96 % | HEART RATE: 77 BPM

## 2025-01-16 DIAGNOSIS — I10 ESSENTIAL HYPERTENSION: Chronic | ICD-10-CM

## 2025-01-16 DIAGNOSIS — I47.10 PAROXYSMAL SVT (SUPRAVENTRICULAR TACHYCARDIA): Primary | Chronic | ICD-10-CM

## 2025-01-16 RX ORDER — DULAGLUTIDE 0.75 MG/.5ML
INJECTION, SOLUTION SUBCUTANEOUS
COMMUNITY
Start: 2024-11-18

## 2025-02-19 ENCOUNTER — HOSPITAL ENCOUNTER (EMERGENCY)
Facility: HOSPITAL | Age: 50
Discharge: HOME | End: 2025-02-19
Attending: EMERGENCY MEDICINE | Admitting: EMERGENCY MEDICINE
Payer: COMMERCIAL

## 2025-02-19 ENCOUNTER — APPOINTMENT (OUTPATIENT)
Dept: GENERAL RADIOLOGY | Facility: HOSPITAL | Age: 50
End: 2025-02-19
Payer: COMMERCIAL

## 2025-02-19 VITALS
HEIGHT: 63 IN | BODY MASS INDEX: 33.66 KG/M2 | TEMPERATURE: 98 F | OXYGEN SATURATION: 95 % | RESPIRATION RATE: 20 BRPM | WEIGHT: 190 LBS | HEART RATE: 85 BPM | DIASTOLIC BLOOD PRESSURE: 76 MMHG | SYSTOLIC BLOOD PRESSURE: 107 MMHG

## 2025-02-19 DIAGNOSIS — I10 ESSENTIAL HYPERTENSION: ICD-10-CM

## 2025-02-19 DIAGNOSIS — I47.10 SVT (SUPRAVENTRICULAR TACHYCARDIA): Primary | ICD-10-CM

## 2025-02-19 LAB
ALBUMIN SERPL-MCNC: 4.2 G/DL (ref 3.5–5.2)
ALBUMIN/GLOB SERPL: 1.3 G/DL
ALP SERPL-CCNC: 87 U/L (ref 39–117)
ALT SERPL W P-5'-P-CCNC: 26 U/L (ref 1–33)
ANION GAP SERPL CALCULATED.3IONS-SCNC: 7 MMOL/L (ref 5–15)
AST SERPL-CCNC: 22 U/L (ref 1–32)
BASOPHILS # BLD AUTO: 0.06 10*3/MM3 (ref 0–0.2)
BASOPHILS NFR BLD AUTO: 0.3 % (ref 0–1.5)
BILIRUB SERPL-MCNC: 0.3 MG/DL (ref 0–1.2)
BUN SERPL-MCNC: 17 MG/DL (ref 6–20)
BUN/CREAT SERPL: 23.3 (ref 7–25)
CALCIUM SPEC-SCNC: 9.3 MG/DL (ref 8.6–10.5)
CHLORIDE SERPL-SCNC: 100 MMOL/L (ref 98–107)
CO2 SERPL-SCNC: 28 MMOL/L (ref 22–29)
CREAT SERPL-MCNC: 0.73 MG/DL (ref 0.57–1)
DEPRECATED RDW RBC AUTO: 41.2 FL (ref 37–54)
EGFRCR SERPLBLD CKD-EPI 2021: 100.3 ML/MIN/1.73
EOSINOPHIL # BLD AUTO: 0.18 10*3/MM3 (ref 0–0.4)
EOSINOPHIL NFR BLD AUTO: 0.8 % (ref 0.3–6.2)
ERYTHROCYTE [DISTWIDTH] IN BLOOD BY AUTOMATED COUNT: 11.9 % (ref 12.3–15.4)
GEN 5 1HR TROPONIN T REFLEX: 38 NG/L
GLOBULIN UR ELPH-MCNC: 3.3 GM/DL
GLUCOSE SERPL-MCNC: 188 MG/DL (ref 65–99)
HCT VFR BLD AUTO: 44.9 % (ref 34–46.6)
HGB BLD-MCNC: 14.6 G/DL (ref 12–15.9)
HOLD SPECIMEN: NORMAL
IMM GRANULOCYTES # BLD AUTO: 0.16 10*3/MM3 (ref 0–0.05)
IMM GRANULOCYTES NFR BLD AUTO: 0.7 % (ref 0–0.5)
LIPASE SERPL-CCNC: 28 U/L (ref 13–60)
LYMPHOCYTES # BLD AUTO: 4.98 10*3/MM3 (ref 0.7–3.1)
LYMPHOCYTES NFR BLD AUTO: 22.4 % (ref 19.6–45.3)
MAGNESIUM SERPL-MCNC: 2 MG/DL (ref 1.6–2.6)
MCH RBC QN AUTO: 30.5 PG (ref 26.6–33)
MCHC RBC AUTO-ENTMCNC: 32.5 G/DL (ref 31.5–35.7)
MCV RBC AUTO: 93.9 FL (ref 79–97)
MONOCYTES # BLD AUTO: 1.58 10*3/MM3 (ref 0.1–0.9)
MONOCYTES NFR BLD AUTO: 7.1 % (ref 5–12)
NEUTROPHILS NFR BLD AUTO: 15.3 10*3/MM3 (ref 1.7–7)
NEUTROPHILS NFR BLD AUTO: 68.7 % (ref 42.7–76)
NRBC BLD AUTO-RTO: 0 /100 WBC (ref 0–0.2)
NT-PROBNP SERPL-MCNC: 1672 PG/ML (ref 0–900)
PLATELET # BLD AUTO: 302 10*3/MM3 (ref 140–450)
PMV BLD AUTO: 8.7 FL (ref 6–12)
POTASSIUM SERPL-SCNC: 3.9 MMOL/L (ref 3.5–5.2)
PROT SERPL-MCNC: 7.5 G/DL (ref 6–8.5)
QT INTERVAL: 252 MS
QTC INTERVAL: 438 MS
RBC # BLD AUTO: 4.78 10*6/MM3 (ref 3.77–5.28)
SODIUM SERPL-SCNC: 135 MMOL/L (ref 136–145)
TROPONIN T % DELTA: 23
TROPONIN T NUMERIC DELTA: 7 NG/L
TROPONIN T SERPL HS-MCNC: 31 NG/L
TSH SERPL DL<=0.05 MIU/L-ACNC: 2.88 UIU/ML (ref 0.27–4.2)
WBC NRBC COR # BLD AUTO: 22.26 10*3/MM3 (ref 3.4–10.8)
WHOLE BLOOD HOLD COAG: NORMAL
WHOLE BLOOD HOLD SPECIMEN: NORMAL

## 2025-02-19 PROCEDURE — 99284 EMERGENCY DEPT VISIT MOD MDM: CPT

## 2025-02-19 PROCEDURE — 83735 ASSAY OF MAGNESIUM: CPT | Performed by: EMERGENCY MEDICINE

## 2025-02-19 PROCEDURE — 83690 ASSAY OF LIPASE: CPT | Performed by: EMERGENCY MEDICINE

## 2025-02-19 PROCEDURE — 71045 X-RAY EXAM CHEST 1 VIEW: CPT

## 2025-02-19 PROCEDURE — 93005 ELECTROCARDIOGRAM TRACING: CPT | Performed by: EMERGENCY MEDICINE

## 2025-02-19 PROCEDURE — 25810000003 SODIUM CHLORIDE 0.9 % SOLUTION: Performed by: EMERGENCY MEDICINE

## 2025-02-19 PROCEDURE — 84484 ASSAY OF TROPONIN QUANT: CPT | Performed by: EMERGENCY MEDICINE

## 2025-02-19 PROCEDURE — 96374 THER/PROPH/DIAG INJ IV PUSH: CPT

## 2025-02-19 PROCEDURE — 99285 EMERGENCY DEPT VISIT HI MDM: CPT

## 2025-02-19 PROCEDURE — 93005 ELECTROCARDIOGRAM TRACING: CPT

## 2025-02-19 PROCEDURE — 36415 COLL VENOUS BLD VENIPUNCTURE: CPT

## 2025-02-19 PROCEDURE — 25010000002 ADENOSINE PER 6 MG

## 2025-02-19 PROCEDURE — 80050 GENERAL HEALTH PANEL: CPT | Performed by: EMERGENCY MEDICINE

## 2025-02-19 PROCEDURE — 83880 ASSAY OF NATRIURETIC PEPTIDE: CPT | Performed by: EMERGENCY MEDICINE

## 2025-02-19 RX ORDER — ADENOSINE 3 MG/ML
12 INJECTION, SOLUTION INTRAVENOUS ONCE
Status: COMPLETED | OUTPATIENT
Start: 2025-02-19 | End: 2025-02-19

## 2025-02-19 RX ORDER — ASPIRIN 81 MG/1
324 TABLET, CHEWABLE ORAL ONCE
Status: DISCONTINUED | OUTPATIENT
Start: 2025-02-19 | End: 2025-02-19 | Stop reason: HOSPADM

## 2025-02-19 RX ORDER — SODIUM CHLORIDE 0.9 % (FLUSH) 0.9 %
10 SYRINGE (ML) INJECTION AS NEEDED
Status: DISCONTINUED | OUTPATIENT
Start: 2025-02-19 | End: 2025-02-19 | Stop reason: HOSPADM

## 2025-02-19 RX ORDER — ADENOSINE 3 MG/ML
INJECTION, SOLUTION INTRAVENOUS
Status: COMPLETED
Start: 2025-02-19 | End: 2025-02-19

## 2025-02-19 RX ADMIN — ADENOSINE 12 MG: 3 INJECTION, SOLUTION INTRAVENOUS at 04:21

## 2025-02-19 RX ADMIN — ADENOSINE 12 MG: 3 INJECTION INTRAVENOUS at 04:21

## 2025-02-19 RX ADMIN — SODIUM CHLORIDE 1000 ML: 9 INJECTION, SOLUTION INTRAVENOUS at 04:18

## 2025-02-19 NOTE — ED PROVIDER NOTES
Subjective   History of Present Illness  This is a 50-year-old female with past medical history of diabetes and hypertension presenting to the emergency department with some palpitations.  Patient states that she has had this throughout the day.  Started earlier today while she was at work.  She was able to calm self down and it resolved on its own.  However she was laying down to go to bed when she felt a fluttering in her chest resume.  She states that she felt like her heart was pounding out of her chest.  She had some tightness associated with this.  Patient has had this before in the past about a year ago.  Denies any excessive stimulant use.  No fevers or chills.  No headache or change in vision.  No focal weakness.  No shortness of breath.  No abdominal pain or vomiting    History provided by:  Patient   used: No        Review of Systems   Constitutional:  Negative for chills and fever.   HENT:  Negative for congestion, ear pain and sore throat.    Eyes:  Negative for visual disturbance.   Respiratory:  Negative for shortness of breath.    Cardiovascular:  Positive for chest pain and palpitations.   Gastrointestinal:  Negative for abdominal pain.   Genitourinary:  Negative for difficulty urinating.   Musculoskeletal:  Negative for arthralgias.   Skin:  Negative for rash.   Neurological:  Negative for dizziness, weakness and numbness.   Psychiatric/Behavioral:  Negative for agitation.        Past Medical History:   Diagnosis Date    Cancer 1999    Cervical    Diabetes mellitus     Heart valve disease 2005    Hypertension        No Known Allergies    Past Surgical History:   Procedure Laterality Date    UTERINE FIBROID SURGERY         Family History   Problem Relation Age of Onset    Cancer Mother     Lung cancer Mother     Coronary artery disease Father     Heart attack Father     Stroke Sister     Heart disease Sister         Possible result of drug use (per pt.)    Breast cancer Sister          Years ago, in remission    No Known Problems Sister     No Known Problems Brother        Social History     Socioeconomic History    Marital status:    Tobacco Use    Smoking status: Never     Passive exposure: Past    Smokeless tobacco: Never   Vaping Use    Vaping status: Never Used   Substance and Sexual Activity    Alcohol use: Not Currently    Drug use: No    Sexual activity: Yes     Partners: Male     Birth control/protection: None           Objective   Physical Exam  Vitals and nursing note reviewed.   Constitutional:       General: She is not in acute distress.     Appearance: She is not ill-appearing or toxic-appearing.   HENT:      Mouth/Throat:      Pharynx: No posterior oropharyngeal erythema.   Eyes:      Conjunctiva/sclera: Conjunctivae normal.      Pupils: Pupils are equal, round, and reactive to light.   Cardiovascular:      Rate and Rhythm: Regular rhythm. Tachycardia present.   Pulmonary:      Effort: Pulmonary effort is normal. No respiratory distress.   Abdominal:      General: Abdomen is flat. There is no distension.      Palpations: There is no mass.      Tenderness: There is no abdominal tenderness. There is no guarding or rebound.   Musculoskeletal:         General: No deformity. Normal range of motion.   Skin:     General: Skin is warm.      Findings: No rash.   Neurological:      General: No focal deficit present.      Mental Status: She is alert and oriented to person, place, and time.      Motor: No weakness.         ECG 12 Lead Rhythm Change      Date/Time: 2/19/2025 4:09 AM    Performed by: Baudilio Garrett MD  Authorized by: Baudilio Garrett MD  Interpreted by ED physician  Comparison: compared with previous ECG   Comparison to previous ECG: New SVT  Rhythm: SVT  Rate: tachycardic  BPM: 182  QRS axis: normal  Conduction: conduction normal  Other findings comments: Nonspecific ST changes  Clinical impression: non-specific ECG and dysrhythmia - atrial  Comments:  Interpretation:  EKG was directly visualized by myself, interpretations as documented in hospital course.    Chemical Cardioversion    Date/Time: 2/19/2025 4:36 AM    Performed by: Baudilio Garrett MD  Authorized by: Baudilio Garrett MD    Consent:     Consent obtained:  Verbal and written    Consent given by:  Patient    Risks discussed:  Death and induced arrhythmia    Alternatives discussed:  Electrical cardioversion, anti-coagulation medication and delayed treatment  Pre-procedure details:     Cardioversion basis:  Emergent    Rhythm:  Supraventricular tachycardia  Attempt one:     Cardioversion medication:  Adenosine 12mg      Medication outcome:  Conversion to normal sinus rhythm  Post-procedure details:     Patient status:  Awake    Patient tolerance of procedure:  Tolerated well, no immediate complications             ED Course  ED Course as of 02/19/25 0600   Wed Feb 19, 2025   0437 BP: 121/92 [JK]   0437 Noninvasive MAP (mmHg): 97 [JK]   0437 Temp: 98 °F (36.7 °C) [JK]   0437 Temp src: Oral [JK]   0437 Heart Rate(!): 190 [JK]   0437 Resp: 20 [JK]   0437 SpO2: 98 % [JK]   0437 Device (Oxygen Therapy): room air  Interpretation:  Patient's repeat vitals, telemetry tracing, and pulse oximetry tracing were directly viewed and interpreted by myself.   O2 sat 98% on room air, interpreted as normal.  Telemetry rhythm strip revealed a rate of 190 bpm, interpreted as supraventricular tachycardia [JK]   0558 CBC & Differential(!) [JK]   0558 Comprehensive Metabolic Panel(!) [JK]   0558 High Sensitivity Troponin T(!) [JK]   0558 BNP(!) [JK]   0558 BNP(!) [JK]   0558 Magnesium [JK]   0558 TSH Rfx On Abnormal To Free T4 [JK]   0558 High Sensitivity Troponin T 1Hr(!!) [JK]   0558 Lipase  Interpretation:  Laboratory studies were reviewed and interpreted directly by myself.  CBC showed some leukocytosis with a white blood cell count of 22, BNP was elevated at 1672, magnesium normal, CMP normal, initial troponin was  31, repeat was 38, TSH normal [JK]   0558 XR Chest 1 View  Interpretation:  Imaging was directly visualized by myself, per my interpretations, chest x-ray unremarkable. [JK]   0558 On reevaluation, the patient is feeling better.  Has been no dysrhythmia.  She is pain-free.  I did discuss her results regarding the troponin.  We went over possible observation versus outpatient management.  The patient has good follow-up with cardiologist.  She prefers to maintain her outpatient follow-up.  Instructed her to call her cardiology in the morning to obtain follow-up.  Given strict return precautions.  Verbalized understanding [JK]   9949 I had a discussion with the patient/family regarding diagnosis, diagnostic results, treatment plan, and medications. The patient/family indicated understanding of these instructions. I spent adequate time at the bedside prior to discharge necessary to discuss the aftercare instructions, giving patient education, providing explanations of the results of our evaluations/findings, and my decision making to assure that the patient/family understand the plan of care. Time was allotted to answer questions at that time and throughout the ED course. Patient is required to maintain timely follow up, as discussed. I also discussed the potential for the development of an acute emergent condition requiring further evaluation, return to the ER, admission, or even surgical intervention.  I encouraged the patient to return to the emergency department immediately for any concerns, worsening symptoms, new complaints, or if symptoms persist and they are unable to seek follow-up in a timely fashion. The patient/family expressed understanding and agreement with this plan    Shared decision making:   After full review of the patient's clinical presentation, review of any work-up including but not limited to laboratory studies and radiology obtained, I had a discussion with the patient.  Treatment options were  discussed as well as the risks, benefits and consequences.  I discussed all findings with the patient and family members if available.  During the discussion, treatment goals were understood by all as well as any misconceptions which were addressed with the patient.  Ample time was given for any questions they may have had.  They are in agreement with the treatment plan as well as final disposition. [JK]      ED Course User Index  [JK] Baudilio Garrett MD                                                       Medical Decision Making  This is a 50-year-old female presented to the emergency department with palpitations and chest tightness.  Patient's symptoms are concerning for tacky dysrhythmia.  Patient appears to be in supraventricular tachycardia.  Overall, the patient is nontoxic.  Afebrile.  IV access was established and the patient.  Placed on continuous telemetry monitoring.  Given the patient's presentation, differential is broad and will require further evaluation.  Workup initiated.      Differential diagnosis: CAD, ACS, peptic ulcer disease, dyspepsia, pneumonia, bronchitis, atypical chest pain, angina, musculoskeletal pain, dysrhythmia, SVT      Amount and/or Complexity of Data Reviewed  External Data Reviewed: labs, radiology, ECG and notes.     Details: External laboratories, imaging as well as notes were reviewed personally by myself.  All relevant studies were used to guide decision making.     Date of previous record: 1/16/2025    Source of note: Cardiology    Summary:  Patient was seen and evaluated for routine visit.  I did review basic laboratory studies on file as well as a previous chest x-ray and EKG.  Records reviewed    Labs: ordered. Decision-making details documented in ED Course.  Radiology: ordered and independent interpretation performed. Decision-making details documented in ED Course.  ECG/medicine tests: ordered and independent interpretation performed. Decision-making details  documented in ED Course.    Risk  OTC drugs.  Prescription drug management.    Critical Care  Total time providing critical care: 35 minutes (Authorized and performed by: Baudilio Garrett MD  I personally spent a total of 35 minutes of critical care time with the patient.  Due to the high probability of clinically significant, life-threatening deterioration, the patient required my highest level of care to intervene emergently.  These interventions, including, but not limited to, establishing IV access, continuous pulse oximeter and telemetry monitoring, frequent monitoring and reevaluations, management the patient's airway and cardiovascular system, discussion with other consultants as needed, which bear directly on the management the patient.  This also includes obtaining history, examining the patient, frequent reevaluations and coordinating high level of care.  Failure to emergently initiate these interventions would carry a high probability of resulting in sudden, clinically significant or life threatening deterioration in the patient's condition.  This does not include time spent on separately reported billable procedures.)        Final diagnoses:   SVT (supraventricular tachycardia)   Essential hypertension       ED Disposition  ED Disposition       ED Disposition   Discharge    Condition   Stable    Comment   --               Jaison Christopher MD  [address] [address]  [phone]    Call in 1 day           Medication List      No changes were made to your prescriptions during this visit.            Baudilio Garrett MD  02/19/25 0600

## 2025-02-20 ENCOUNTER — TELEPHONE (OUTPATIENT)
Dept: CARDIOLOGY | Facility: CLINIC | Age: 50
End: 2025-02-20
Payer: COMMERCIAL

## 2025-02-20 NOTE — TELEPHONE ENCOUNTER
Caller: Rabia Forte    Relationship to patient: Self    Best call back number: [phone]    New or established patient?  [] New  [x] Established    Date of discharge: 2/19/25    [facility]    Diagnosis/Symptoms: SVT, HYPERTENSION    Length of stay (If applicable): ER VISIT     Specialty Only: Did you see a [facility] health provider?    [] Yes  [x] No  If so, who?     Additional Details: PLEASE CALL BACK TO SCHEDULE, THANK YOU.

## 2025-02-21 NOTE — TELEPHONE ENCOUNTER
Caller: Rabia Forte    Relationship: Self    Best call back number: [phone]    What is the best time to reach you: ANYTIME- IF PT IS UNABLE TO ANSWER PLEASE CONTACT HER THROUGH HER WORK NUMBER- [phone].     What was the call regarding: PT WAS CALLING TO GET A STATUS UPDATE ON HER SCHEDULING REQUEST. SHE IS STILL HAVING CONCERNS AND WOULD LIKE TO GET IN ASAP. PLEASE REACH OUT TO THE PT.

## 2025-02-22 LAB
QT INTERVAL: 358 MS
QTC INTERVAL: 428 MS

## 2025-02-26 LAB
QT INTERVAL: 252 MS
QTC INTERVAL: 438 MS

## 2025-02-27 ENCOUNTER — OFFICE VISIT (OUTPATIENT)
Dept: CARDIOLOGY | Facility: CLINIC | Age: 50
End: 2025-02-27
Payer: COMMERCIAL

## 2025-02-27 VITALS
SYSTOLIC BLOOD PRESSURE: 122 MMHG | DIASTOLIC BLOOD PRESSURE: 68 MMHG | HEART RATE: 73 BPM | WEIGHT: 195 LBS | HEIGHT: 63 IN | OXYGEN SATURATION: 98 % | BODY MASS INDEX: 34.55 KG/M2

## 2025-02-27 DIAGNOSIS — I47.10 PAROXYSMAL SVT (SUPRAVENTRICULAR TACHYCARDIA): Primary | Chronic | ICD-10-CM

## 2025-02-27 DIAGNOSIS — I47.10 PAROXYSMAL SVT (SUPRAVENTRICULAR TACHYCARDIA): Primary | ICD-10-CM

## 2025-02-27 DIAGNOSIS — I10 ESSENTIAL HYPERTENSION: Chronic | ICD-10-CM

## 2025-02-27 PROCEDURE — 99214 OFFICE O/P EST MOD 30 MIN: CPT

## 2025-02-28 NOTE — PROGRESS NOTES
"Rabia Forte  [id number]  1975 02/27/2025      [facility] CARDIOLOGY     Referring Provider: No ref. provider found     Joanne Bangura MD  [address] [place] [address]    Chief Complaint   Patient presents with    Paroxysmal SVT (supraventricular tachycardia)     1-Wk F/U       Problem List  SVT  Diagnosed during pregnancy in 2005  ED visit, 10/12/24:  Conversion with IV Diltiazem   Monitor, 10/17/24: predominantly SR - one episode of WCT (around 100bpm), which terminates with sinus pause - short episodes of NCT which appears to be likely SVT - symptoms during SR   Echo, 10/23/24: EF 61-65%, no significant valvular disease  Hypertension  Diabetes     History of Present Illness   Rabia Forte is a 50 y.o. female who presents to my electrophysiology clinic for follow up of SVT.  Most recently seen in January by Dr. Christopher for SVT.  She decided at that time she would want a pursue medications over procedure.  On 2/29 patient had an episode that lasted several hours and had to present to the ED.  She returns to clinic today to talk about SVT ablation.    Outpatient Medications Marked as Taking for the 2/27/25 encounter (Office Visit) with Caral Garcia APRN   Medication Sig Dispense Refill    buPROPion XL (WELLBUTRIN XL) 300 MG 24 hr tablet Take 1 tablet by mouth Every Morning.      citalopram (CeleXA) 20 MG tablet Take 1 tablet by mouth Daily.      metoprolol succinate XL (TOPROL-XL) 50 MG 24 hr tablet Take 1 tablet by mouth Daily. 90 tablet 1    Trulicity 0.75 MG/0.5ML solution auto-injector INJECT 1 SYRINGE (0.75 MG) SUBCUTANEOUSLY ONCE A WEEK              Physical Exam  Vitals:    02/27/25 0905   BP: 122/68   BP Location: Right arm   Patient Position: Sitting   Cuff Size: Adult   Pulse: 73   SpO2: 98%   Weight: 88.5 kg (195 lb)   Height: 160 cm (63\")     Body mass index is 34.54 kg/m².  Constitutional:       Appearance: Healthy appearance.   Neck:      " "Vascular: JVD normal.   Pulmonary:      Effort: Pulmonary effort is normal.      Breath sounds: Normal breath sounds.   Cardiovascular:      Normal rate. Regular rhythm. Normal S1. Normal S2.       Murmurs: There is no murmur.      No gallop.  No rub.   Pulses:     Intact distal pulses.   Edema:     Peripheral edema absent.   Neurological:      General: No focal deficit present.          Diagnostic Data  Procedures    Lab Results   Component Value Date    GLUCOSE 188 (H) 02/19/2025    CALCIUM 9.3 02/19/2025     (L) 02/19/2025    K 3.9 02/19/2025    CO2 28.0 02/19/2025     02/19/2025    BUN 17 02/19/2025    CREATININE 0.73 02/19/2025    EGFRIFAFRI >60 12/09/2021    EGFRIFNONA >60 12/09/2021    BCR 23.3 02/19/2025    ANIONGAP 7.0 02/19/2025     Lab Results   Component Value Date    WBC 22.26 (H) 02/19/2025    HGB 14.6 02/19/2025    HCT 44.9 02/19/2025    MCV 93.9 02/19/2025     02/19/2025     No results found for: \"INR\", \"PROTIME\"  Lab Results   Component Value Date    TSH 2.880 02/19/2025       I personally viewed and interpreted the patient's EKG/Telemetry/lab data    Rabia Forte  reports that she has never smoked. She has been exposed to tobacco smoke. She has never used smokeless tobacco. I have educated her on the risk of diseases from using tobacco products such as cancer, COPD, and heart disease.           Assessment and Plan  Diagnoses and all orders for this visit:    1. Paroxysmal SVT (supraventricular tachycardia) (Primary)    2. Essential hypertension        SVT  -ED visit, 10/12/24:  conversion IV cardizem              - short RP tachycardia  -Monitor, 10/17/24: predominantly SR - one episode of WCT (around 100bpm), which terminates with sinus pause - short episodes of NCT which appears to be likely SVT   -Echo, 10/23/24: EF 61-65%, no significant valvular disease  -ED visit, 2/19/25  -After long discussion discussion patient would like to pursue EP study +/- SVT " ablation   -Hold metoprolol 5 days prior     Hypertension  -Well controlled, continue current medication regimen    Follow-Up  Return for Follow up after procedure.      Thank you for allowing me to participate in the care of your patient. Please to not hesitate to contact me with additional questions or concerns.     Carla Garcia, DOMINGA

## 2025-02-28 NOTE — H&P (VIEW-ONLY)
"Rabia Forte  [id number]  1975 02/27/2025      [facility] CARDIOLOGY     Referring Provider: No ref. provider found     Joanne Bangura MD  [address] [place] [id number]    Chief Complaint   Patient presents with    Paroxysmal SVT (supraventricular tachycardia)     1-Wk F/U       Problem List  SVT  Diagnosed during pregnancy in 2005  ED visit, 10/12/24:  Conversion with IV Diltiazem   Monitor, 10/17/24: predominantly SR - one episode of WCT (around 100bpm), which terminates with sinus pause - short episodes of NCT which appears to be likely SVT - symptoms during SR   Echo, 10/23/24: EF 61-65%, no significant valvular disease  Hypertension  Diabetes     History of Present Illness   Rabia Forte is a 50 y.o. female who presents to my electrophysiology clinic for follow up of SVT.  Most recently seen in January by Dr. Christopher for SVT.  She decided at that time she would want a pursue medications over procedure.  On 2/29 patient had an episode that lasted several hours and had to present to the ED.  She returns to clinic today to talk about SVT ablation.    Outpatient Medications Marked as Taking for the 2/27/25 encounter (Office Visit) with Carla Garcia APRN   Medication Sig Dispense Refill    buPROPion XL (WELLBUTRIN XL) 300 MG 24 hr tablet Take 1 tablet by mouth Every Morning.      citalopram (CeleXA) 20 MG tablet Take 1 tablet by mouth Daily.      metoprolol succinate XL (TOPROL-XL) 50 MG 24 hr tablet Take 1 tablet by mouth Daily. 90 tablet 1    Trulicity 0.75 MG/0.5ML solution auto-injector INJECT 1 SYRINGE (0.75 MG) SUBCUTANEOUSLY ONCE A WEEK              Physical Exam  Vitals:    02/27/25 0905   BP: 122/68   BP Location: Right arm   Patient Position: Sitting   Cuff Size: Adult   Pulse: 73   SpO2: 98%   Weight: 88.5 kg (195 lb)   Height: 160 cm (63\")     Body mass index is 34.54 kg/m².  Constitutional:       Appearance: Healthy appearance.   Neck:      " "Vascular: JVD normal.   Pulmonary:      Effort: Pulmonary effort is normal.      Breath sounds: Normal breath sounds.   Cardiovascular:      Normal rate. Regular rhythm. Normal S1. Normal S2.       Murmurs: There is no murmur.      No gallop.  No rub.   Pulses:     Intact distal pulses.   Edema:     Peripheral edema absent.   Neurological:      General: No focal deficit present.          Diagnostic Data  Procedures    Lab Results   Component Value Date    GLUCOSE 188 (H) 02/19/2025    CALCIUM 9.3 02/19/2025     (L) 02/19/2025    K 3.9 02/19/2025    CO2 28.0 02/19/2025     02/19/2025    BUN 17 02/19/2025    CREATININE 0.73 02/19/2025    EGFRIFAFRI >60 12/09/2021    EGFRIFNONA >60 12/09/2021    BCR 23.3 02/19/2025    ANIONGAP 7.0 02/19/2025     Lab Results   Component Value Date    WBC 22.26 (H) 02/19/2025    HGB 14.6 02/19/2025    HCT 44.9 02/19/2025    MCV 93.9 02/19/2025     02/19/2025     No results found for: \"INR\", \"PROTIME\"  Lab Results   Component Value Date    TSH 2.880 02/19/2025       I personally viewed and interpreted the patient's EKG/Telemetry/lab data    Rabia Forte  reports that she has never smoked. She has been exposed to tobacco smoke. She has never used smokeless tobacco. I have educated her on the risk of diseases from using tobacco products such as cancer, COPD, and heart disease.           Assessment and Plan  Diagnoses and all orders for this visit:    1. Paroxysmal SVT (supraventricular tachycardia) (Primary)    2. Essential hypertension        SVT  -ED visit, 10/12/24:  conversion IV cardizem              - short RP tachycardia  -Monitor, 10/17/24: predominantly SR - one episode of WCT (around 100bpm), which terminates with sinus pause - short episodes of NCT which appears to be likely SVT   -Echo, 10/23/24: EF 61-65%, no significant valvular disease  -ED visit, 2/19/25  -After long discussion discussion patient would like to pursue EP study +/- SVT " ablation   -Hold metoprolol 5 days prior     Hypertension  -Well controlled, continue current medication regimen    Follow-Up  Return for Follow up after procedure.      Thank you for allowing me to participate in the care of your patient. Please to not hesitate to contact me with additional questions or concerns.     Carla Garcia, DOMINGA

## 2025-03-03 ENCOUNTER — PREP FOR SURGERY (OUTPATIENT)
Dept: OTHER | Facility: HOSPITAL | Age: 50
End: 2025-03-03
Payer: COMMERCIAL

## 2025-03-03 DIAGNOSIS — I47.10 PAROXYSMAL SVT (SUPRAVENTRICULAR TACHYCARDIA): Primary | ICD-10-CM

## 2025-03-03 RX ORDER — NITROGLYCERIN 0.4 MG/1
0.4 TABLET SUBLINGUAL
OUTPATIENT
Start: 2025-03-03

## 2025-03-03 RX ORDER — SODIUM CHLORIDE 9 MG/ML
40 INJECTION, SOLUTION INTRAVENOUS AS NEEDED
OUTPATIENT
Start: 2025-03-03

## 2025-03-03 RX ORDER — ONDANSETRON 2 MG/ML
4 INJECTION INTRAMUSCULAR; INTRAVENOUS EVERY 6 HOURS PRN
OUTPATIENT
Start: 2025-03-03

## 2025-03-03 RX ORDER — SODIUM CHLORIDE 0.9 % (FLUSH) 0.9 %
10 SYRINGE (ML) INJECTION AS NEEDED
OUTPATIENT
Start: 2025-03-03

## 2025-03-03 RX ORDER — SODIUM CHLORIDE 0.9 % (FLUSH) 0.9 %
10 SYRINGE (ML) INJECTION EVERY 12 HOURS SCHEDULED
OUTPATIENT
Start: 2025-03-03

## 2025-03-24 ENCOUNTER — PRE-ADMISSION TESTING (OUTPATIENT)
Dept: PREADMISSION TESTING | Facility: HOSPITAL | Age: 50
End: 2025-03-24
Payer: COMMERCIAL

## 2025-03-24 DIAGNOSIS — I47.10 PAROXYSMAL SVT (SUPRAVENTRICULAR TACHYCARDIA): ICD-10-CM

## 2025-03-24 LAB
ANION GAP SERPL CALCULATED.3IONS-SCNC: 13 MMOL/L (ref 5–15)
BUN SERPL-MCNC: 12 MG/DL (ref 6–20)
BUN/CREAT SERPL: 19 (ref 7–25)
CALCIUM SPEC-SCNC: 9.5 MG/DL (ref 8.6–10.5)
CHLORIDE SERPL-SCNC: 103 MMOL/L (ref 98–107)
CO2 SERPL-SCNC: 24 MMOL/L (ref 22–29)
CREAT SERPL-MCNC: 0.63 MG/DL (ref 0.57–1)
DEPRECATED RDW RBC AUTO: 38.9 FL (ref 37–54)
EGFRCR SERPLBLD CKD-EPI 2021: 108.2 ML/MIN/1.73
ERYTHROCYTE [DISTWIDTH] IN BLOOD BY AUTOMATED COUNT: 11.6 % (ref 12.3–15.4)
GLUCOSE SERPL-MCNC: 112 MG/DL (ref 65–99)
HCT VFR BLD AUTO: 42.9 % (ref 34–46.6)
HGB BLD-MCNC: 14.4 G/DL (ref 12–15.9)
MCH RBC QN AUTO: 30.6 PG (ref 26.6–33)
MCHC RBC AUTO-ENTMCNC: 33.6 G/DL (ref 31.5–35.7)
MCV RBC AUTO: 91.1 FL (ref 79–97)
PLATELET # BLD AUTO: 246 10*3/MM3 (ref 140–450)
PMV BLD AUTO: 8.7 FL (ref 6–12)
POTASSIUM SERPL-SCNC: 4.3 MMOL/L (ref 3.5–5.2)
RBC # BLD AUTO: 4.71 10*6/MM3 (ref 3.77–5.28)
SODIUM SERPL-SCNC: 140 MMOL/L (ref 136–145)
WBC NRBC COR # BLD AUTO: 10.89 10*3/MM3 (ref 3.4–10.8)

## 2025-03-24 PROCEDURE — 85027 COMPLETE CBC AUTOMATED: CPT

## 2025-03-24 PROCEDURE — 36415 COLL VENOUS BLD VENIPUNCTURE: CPT

## 2025-03-24 PROCEDURE — 80048 BASIC METABOLIC PNL TOTAL CA: CPT

## 2025-03-24 NOTE — DISCHARGE INSTRUCTIONS
"Holding GLP-1 Receptor Agonists Prior to Anesthesia    In order for safe anesthesia, GLP-1 receptor agonist mediations need to be held before the procedure.     What are GLP-1 Receptor Agonists?  Glucagon-like peptide-1 (GLP-1) receptor agonists are approved by the Food and Drug Administration for treatment of type 2 diabetes mellitus and cardiovascular risk reduction. In addition, GLP-1 receptor agonists are also used for weight loss.     Which of my medications are GLP-1 Receptor Agonists?   Exanetide (Byetta®, Bydureon®)  Lixisenatide (Adlyxin®, Soliqua®)  Semaglutide (Wegovy®, Ozempic®, Rybelsus®)  Liraglutide (Saxenda®, Victoza®, Xutolphy®)  Dulaglutide (Trulicity®)   Tirzepatide (Mounjaro®, Zepbound®)    How can GLP-1 Receptor Agonists cause problems during surgery?  GLP-1 agonists can potentially cause adverse gastrointestinal effects, including the consequences of delayed gastric emptying. This can cause the stomach to be full even after refraining from eating and drinking before surgery and can cause regurgitation or \"throwing up\" of the stomach contents during anesthesia. If the contents enter the airway and the lungs, it could cause anesthesia complications and a severe pneumonia.     What should I do prior to my procedure?  According to the recommendations of the American Society of Anesthesiologists:    If you take GLP-1 agonists every day: Hold the medication on the day of the procedure/surgery.   If you take GLP-1 agonists once a week: Hold the medication a week prior to the procedure/surgery    What if I have questions?  If you have concerns or questions about holding your medications prior to your procedure, contact your doctors before stopping your medications.    Modified from the American Society of Anesthesiologists Consensus-Based Guidance on Preoperative Management of Patients (Adults and Children) on Glucagon-Like Peptide-1 (GLP-1) Receptor Agonists June 29,2023Dear Patient,    Do NOT eat, " drink, or smoke after midnight the night before your procedure.   Take your medications as instructed by your doctor.    Glasses and jewelry may be worn, but dentures must be removed prior to your procedure.    Leave any items you consider valuable at home.      MORNING of your Procedure, please bring the following:     -Photo ID and insurance card(s)    -ALL medications in their ORIGINAL CONTAINERS or current medication list.    -Co-pay and/or deductible required by your insurance   -Copy of living will or power of  document (if not brought to Pre-Admission Testing department)   -CPAP mask and tubing, not your machine (if applicable)   -Relaxation aids (music, books, magazines)   -Skin Prep Instruction Sheet (if applicable)       Check in is on the 2nd floor of the [address] [facility].  Your procedure will be performed in the cath lab or EP lab.  During your procedure, your family will wait in the cath lab waiting area where you checked in.      Need to make arrangements for transportation prior to discharge.    Educational handout related to procedure was given in Pre-Admission testing.  The educational handout is for informational purposes only.  If you have any questions about your procedure, please speak with your physician.      Please note:  If you are scheduled to have one of the following procedures: Pulmonary Vein Ablation, Lead Extraction, MitraClip, Cerebral Coilings or Embolization, please let your family know that after your procedure you will be going to recovery unit on the 2nd floor of the [facility].  When the physician is finished speaking with your family after your procedure is completed, your family will be directed or escorted to the surgery waiting area in the [facility].  This is where your family will wait until you are given a room assignment and then your family will be directed to the appropriate unit.

## 2025-03-24 NOTE — PAT
An arrival time for procedure was not provided during PAT visit. If patient had any questions or concerns about their arrival time, they were instructed to contact their surgeon/physician.  Additionally, if the patient referred to an arrival time that was acquired from their my chart account, patient was encouraged to verify that time with their surgeon/physician. Arrival times are NOT provided in Pre Admission Testing Department.    Patient denies any current skin issues.

## 2025-03-25 ENCOUNTER — HOSPITAL ENCOUNTER (OUTPATIENT)
Facility: HOSPITAL | Age: 50
Setting detail: HOSPITAL OUTPATIENT SURGERY
Discharge: HOME | End: 2025-03-25
Attending: INTERNAL MEDICINE | Admitting: INTERNAL MEDICINE
Payer: COMMERCIAL

## 2025-03-25 VITALS
OXYGEN SATURATION: 95 % | RESPIRATION RATE: 13 BRPM | HEIGHT: 64 IN | WEIGHT: 194.4 LBS | TEMPERATURE: 97.7 F | BODY MASS INDEX: 33.19 KG/M2 | DIASTOLIC BLOOD PRESSURE: 120 MMHG | SYSTOLIC BLOOD PRESSURE: 149 MMHG | HEART RATE: 90 BPM

## 2025-03-25 DIAGNOSIS — I47.10 PAROXYSMAL SVT (SUPRAVENTRICULAR TACHYCARDIA): ICD-10-CM

## 2025-03-25 LAB — B-HCG UR QL: NEGATIVE

## 2025-03-25 PROCEDURE — C2630 CATH, EP, COOL-TIP: HCPCS | Performed by: INTERNAL MEDICINE

## 2025-03-25 PROCEDURE — 25010000002 MIDAZOLAM PER 1 MG: Performed by: INTERNAL MEDICINE

## 2025-03-25 PROCEDURE — 93653 COMPRE EP EVAL TX SVT: CPT | Performed by: INTERNAL MEDICINE

## 2025-03-25 PROCEDURE — 25010000002 BUPIVACAINE 0.5 % SOLUTION: Performed by: INTERNAL MEDICINE

## 2025-03-25 PROCEDURE — 25010000002 LIDOCAINE SOLUTION: Performed by: INTERNAL MEDICINE

## 2025-03-25 PROCEDURE — 93623 PRGRMD STIMJ&PACG IV RX NFS: CPT | Performed by: INTERNAL MEDICINE

## 2025-03-25 PROCEDURE — C1760 CLOSURE DEV, VASC: HCPCS | Performed by: INTERNAL MEDICINE

## 2025-03-25 PROCEDURE — 25810000003 SODIUM CHLORIDE 0.9 % SOLUTION: Performed by: INTERNAL MEDICINE

## 2025-03-25 PROCEDURE — 99153 MOD SED SAME PHYS/QHP EA: CPT | Performed by: INTERNAL MEDICINE

## 2025-03-25 PROCEDURE — 25010000002 FENTANYL CITRATE (PF) 50 MCG/ML SOLUTION: Performed by: INTERNAL MEDICINE

## 2025-03-25 PROCEDURE — 81025 URINE PREGNANCY TEST: CPT | Performed by: INTERNAL MEDICINE

## 2025-03-25 PROCEDURE — C1730 CATH, EP, 19 OR FEW ELECT: HCPCS | Performed by: INTERNAL MEDICINE

## 2025-03-25 PROCEDURE — C1894 INTRO/SHEATH, NON-LASER: HCPCS | Performed by: INTERNAL MEDICINE

## 2025-03-25 PROCEDURE — 99152 MOD SED SAME PHYS/QHP 5/>YRS: CPT | Performed by: INTERNAL MEDICINE

## 2025-03-25 PROCEDURE — 25010000002 ONDANSETRON PER 1 MG: Performed by: INTERNAL MEDICINE

## 2025-03-25 RX ORDER — ONDANSETRON 2 MG/ML
4 INJECTION INTRAMUSCULAR; INTRAVENOUS EVERY 6 HOURS PRN
Status: DISCONTINUED | OUTPATIENT
Start: 2025-03-25 | End: 2025-03-25 | Stop reason: HOSPADM

## 2025-03-25 RX ORDER — BUPIVACAINE HYDROCHLORIDE 5 MG/ML
INJECTION, SOLUTION PERINEURAL
Status: DISCONTINUED | OUTPATIENT
Start: 2025-03-25 | End: 2025-03-25 | Stop reason: HOSPADM

## 2025-03-25 RX ORDER — FENTANYL CITRATE 50 UG/ML
INJECTION, SOLUTION INTRAMUSCULAR; INTRAVENOUS
Status: DISCONTINUED | OUTPATIENT
Start: 2025-03-25 | End: 2025-03-25 | Stop reason: HOSPADM

## 2025-03-25 RX ORDER — ETOMIDATE 2 MG/ML
INJECTION INTRAVENOUS
Status: DISCONTINUED | OUTPATIENT
Start: 2025-03-25 | End: 2025-03-25 | Stop reason: HOSPADM

## 2025-03-25 RX ORDER — ONDANSETRON 2 MG/ML
INJECTION INTRAMUSCULAR; INTRAVENOUS
Status: DISCONTINUED | OUTPATIENT
Start: 2025-03-25 | End: 2025-03-25 | Stop reason: HOSPADM

## 2025-03-25 RX ORDER — MIDAZOLAM HYDROCHLORIDE 1 MG/ML
INJECTION, SOLUTION INTRAMUSCULAR; INTRAVENOUS
Status: DISCONTINUED | OUTPATIENT
Start: 2025-03-25 | End: 2025-03-25 | Stop reason: HOSPADM

## 2025-03-25 RX ORDER — SODIUM CHLORIDE 0.9 % (FLUSH) 0.9 %
10 SYRINGE (ML) INJECTION EVERY 12 HOURS SCHEDULED
Status: DISCONTINUED | OUTPATIENT
Start: 2025-03-25 | End: 2025-03-25 | Stop reason: HOSPADM

## 2025-03-25 RX ORDER — SODIUM CHLORIDE 9 MG/ML
40 INJECTION, SOLUTION INTRAVENOUS AS NEEDED
Status: DISCONTINUED | OUTPATIENT
Start: 2025-03-25 | End: 2025-03-25 | Stop reason: HOSPADM

## 2025-03-25 RX ORDER — SODIUM CHLORIDE 0.9 % (FLUSH) 0.9 %
10 SYRINGE (ML) INJECTION AS NEEDED
Status: DISCONTINUED | OUTPATIENT
Start: 2025-03-25 | End: 2025-03-25 | Stop reason: HOSPADM

## 2025-03-25 RX ORDER — NITROGLYCERIN 0.4 MG/1
0.4 TABLET SUBLINGUAL
Status: DISCONTINUED | OUTPATIENT
Start: 2025-03-25 | End: 2025-03-25 | Stop reason: HOSPADM

## 2025-03-25 RX ORDER — LIDOCAINE HYDROCHLORIDE 5 MG/ML
INJECTION, SOLUTION INFILTRATION; PERINEURAL
Status: DISCONTINUED | OUTPATIENT
Start: 2025-03-25 | End: 2025-03-25 | Stop reason: HOSPADM

## 2025-03-25 RX ORDER — SODIUM CHLORIDE 9 MG/ML
INJECTION, SOLUTION INTRAVENOUS
Status: COMPLETED | OUTPATIENT
Start: 2025-03-25 | End: 2025-03-25

## 2025-03-25 NOTE — INTERVAL H&P NOTE
"  H&P reviewed. The patient was examined and there are no changes to the H&P.      Vitals:    03/25/25 1226 03/25/25 1233 03/25/25 1234   BP: 141/86 134/86 145/93   BP Location: Left arm Left arm Right arm   Patient Position: Lying Lying Lying   Pulse: 82 80 82   Resp: 16     Temp: 97.7 °F (36.5 °C)     TempSrc: Temporal     SpO2: 97% 97% 97%   Weight: 88.2 kg (194 lb 6.4 oz)     Height: 162.6 cm (64\")          Lab Results   Component Value Date    GLUCOSE 112 (H) 03/24/2025    CALCIUM 9.5 03/24/2025     03/24/2025    K 4.3 03/24/2025    CO2 24.0 03/24/2025     03/24/2025    BUN 12 03/24/2025    CREATININE 0.63 03/24/2025    EGFR 108.2 03/24/2025    BCR 19.0 03/24/2025    ANIONGAP 13.0 03/24/2025       Lab Results   Component Value Date    WBC 10.89 (H) 03/24/2025    HGB 14.4 03/24/2025    HCT 42.9 03/24/2025    MCV 91.1 03/24/2025     03/24/2025       Patient is here today for EPS +/- SVT ablation. Procedure, risks, and alternatives have been discussed and patient is agreeable to proceed.  Further recommendations to follow.     Electronically signed by DOMINGA Johnson, 03/25/25, 12:39 PM EDT.    "

## 2025-03-25 NOTE — Clinical Note
Hemostasis started on the right femoral vein. Perclose was used in achieving hemostasis. Closure device deployed in the vessel. Hemostasis achieved successfully. Closure device additional comment: X3

## 2025-03-26 ENCOUNTER — CALL CENTER PROGRAMS (OUTPATIENT)
Dept: CALL CENTER | Facility: HOSPITAL | Age: 50
End: 2025-03-26
Payer: COMMERCIAL

## 2025-03-26 NOTE — OUTREACH NOTE
PCI/Device Survey      Flowsheet Row Responses   Facility patient discharged from? [facility]   Procedure date 03/25/25   Procedure (if device, specify in description) Ablation   Performing MD Other (annotate)  [DR PHILLIPS]   Attempt successful? Yes   Call start time 1122   Call end time 1124   Person spoke with today (if not patient) and relationship    Has the patient had any of the following symptoms since discharge? --  [no issues]   Is the patient taking prescribed medications: N/A   Medication comments STOP metoprolol   Does the patient have any of the following symptoms related to the cath/surgical site? --  [RIGHT Groin site- dressing intact . no issues]   Nursing intervention Patient education provided   Does the patient have an appointment scheduled with the cardiologist? Yes   Appointment comments DR PHILLIPS 6/26/25 9 am   If the patient is a current smoker, are they able to teach back resources for cessation? Not a smoker   Did the patient feel prepared to go home on the same day as the procedure? Yes   Is the patient satisfied with the same day discharge process? Yes   PCI/Device call completed Yes   Wrap up additional comments  reports Pt is doing well. No issues.            NIHARIKA MORRIS - Registered Nurse

## 2025-07-10 ENCOUNTER — OFFICE VISIT (OUTPATIENT)
Dept: CARDIOLOGY | Facility: CLINIC | Age: 50
End: 2025-07-10
Payer: COMMERCIAL

## 2025-07-10 VITALS
HEIGHT: 64 IN | WEIGHT: 193.6 LBS | DIASTOLIC BLOOD PRESSURE: 82 MMHG | HEART RATE: 81 BPM | BODY MASS INDEX: 33.05 KG/M2 | SYSTOLIC BLOOD PRESSURE: 132 MMHG | OXYGEN SATURATION: 98 %

## 2025-07-10 DIAGNOSIS — I10 ESSENTIAL HYPERTENSION: Chronic | ICD-10-CM

## 2025-07-10 DIAGNOSIS — I47.10 PAROXYSMAL SVT (SUPRAVENTRICULAR TACHYCARDIA): Primary | Chronic | ICD-10-CM

## 2025-07-10 NOTE — PROGRESS NOTES
"Electrophysiology Clinic Consult     Rabia Forte  [id number]  1975    Referring Provider: No ref. provider found   PCP: Joanne Bangura MD  [address] [facility] / [place] [id number]    Date of Service: 07/10/25    Chief Complaint   Patient presents with    Paroxysmal SVT (supraventricular tachycardia)     Pt denies current symptoms     Problem List  SVT  Diagnosed during pregnancy in 2005  ED visit, 10/12/24:  Conversion with IV Diltiazem   Monitor, 10/17/24: predominantly SR - one episode of WCT (around 100bpm), which terminates with sinus pause - short episodes of NCT which appears to be likely SVT - symptoms during SR   Echo, 10/23/24: EF 61-65%, no significant valvular disease  Hypertension  Diabetes      Past Medical History:   Diagnosis Date    Cancer 1999    Cervical    Diabetes mellitus     Heart valve disease 2005    Hypertension        History of Present Illness  Rabia Forte is a 50 y.o. female who presents to my electrophysiology clinic for evaluation of SVT. Diagnosed about 20 years ago when she was pregnant with her daughter- was told that she had SVT at that time and has been having intermittent episodes that were either self terminating or responding to vagal maneuver/\"lying down on her stomach\". She was also on metoprolol 25 at that time. Her last event which was on 10/12/2024, she notes it as her worst episode and she came to ER because it didn't respond to vagal maneuver. Endorses symptoms of palpitation, flushing, and lightheadedness. Was started on IV diltiazem and she converted. Since then she was up titrated on metoprolol to 50. No further recurrence and EVELIA was unremarkable for SVT.     UPDATE 7/2025: s/p SP modification for AVNRT. No recurrence of arrhythmia. No chest pain/sob/palpitation. Overall doing well. Moving to [facility] in 2 days.     Review of Systems   Constitutional:  Negative for activity change, fatigue and fever.   Respiratory:  Positive for " "shortness of breath. Negative for chest tightness.    Cardiovascular:  Positive for palpitations. Negative for chest pain and leg swelling.   Gastrointestinal:  Negative for constipation and diarrhea.   Genitourinary:  Negative for decreased urine volume and difficulty urinating.   Skin:  Negative for wound.   Neurological:  Positive for light-headedness. Negative for dizziness, syncope and weakness.   Psychiatric/Behavioral:  Negative for suicidal ideas.        Outpatient Medications Marked as Taking for the 7/10/25 encounter (Office Visit) with Jaison Christopher MD   Medication Sig Dispense Refill    buPROPion XL (WELLBUTRIN XL) 300 MG 24 hr tablet Take 1 tablet by mouth Every Morning.      citalopram (CeleXA) 20 MG tablet Take 1 tablet by mouth Daily.      Multiple Vitamins-Minerals (HAIR SKIN & NAILS PO) Take  by mouth Daily.      Probiotic Product (PROBIOTIC PO) Take  by mouth Daily.      Trulicity 0.75 MG/0.5ML solution pen-injector          Physical Exam  Vitals:    07/10/25 0954   BP: 132/82   BP Location: Left arm   Patient Position: Sitting   Cuff Size: Adult   Pulse: 81   SpO2: 98%   Weight: 87.8 kg (193 lb 9.6 oz)   Height: 162.6 cm (64\")     Body mass index is 33.23 kg/m².    Vitals and nursing note reviewed.   Constitutional:       Appearance: Healthy appearance.   HENT:      Head: Normocephalic and atraumatic.      Nose: Nose normal.   Neck:      Vascular: No JVD.   Pulmonary:      Effort: Pulmonary effort is normal.      Breath sounds: Normal breath sounds.   Cardiovascular:      PMI at left midclavicular line. Normal rate. Regular rhythm. Normal S1. Normal S2.       Murmurs: There is no murmur.      No gallop.    Edema:     Peripheral edema absent.   Skin:     General: Skin is warm and dry.   Neurological:      Mental Status: Oriented to person, place and time.   Psychiatric:         Behavior: Behavior normal.          Diagnostic Data    ECG 12 Lead    Date/Time: 7/10/2025 10:06 AM  Performed by: Ashwin" "Jaison VASQUEZ MD    Authorized by: Jaison Christopher MD  Comparison: compared with previous ECG from 2/22/2025  Similar to previous ECG  Rhythm: sinus rhythm  Rate: normal  Conduction: conduction normal  QRS axis: normal  Other: no other findings    Clinical impression: normal ECG          Lab Results   Component Value Date    GLUCOSE 112 (H) 03/24/2025    CALCIUM 9.5 03/24/2025     03/24/2025    K 4.3 03/24/2025    CO2 24.0 03/24/2025     03/24/2025    BUN 12 03/24/2025    CREATININE 0.63 03/24/2025    EGFRIFAFRI >60 12/09/2021    EGFRIFNONA >60 12/09/2021    BCR 19.0 03/24/2025    ANIONGAP 13.0 03/24/2025     Lab Results   Component Value Date    WBC 10.89 (H) 03/24/2025    HGB 14.4 03/24/2025    HCT 42.9 03/24/2025    MCV 91.1 03/24/2025     03/24/2025     No results found for: \"INR\", \"PROTIME\"  Lab Results   Component Value Date    TSH 2.880 02/19/2025         I personally viewed and interpreted the patient's EKG/Telemetry/lab data    Rabia Forte  reports that she has never smoked. She has been exposed to tobacco smoke. She has never used smokeless tobacco. I have educated her on the risk of diseases from using tobacco products such as cancer, COPD, and heart disease.       I spent 3  minutes counseling the patient.           ACP discussion was declined by the patient. Patient does not have an advance directive, declines further assistance.          Assessment and Plan   Diagnoses and all orders for this visit:    1. Paroxysmal SVT (supraventricular tachycardia) (Primary)    2. Essential hypertension    Other orders  -     ECG 12 Lead          SVT  -ED visit, 10/12/24:  conversion IV cardizem   - short RP tachycardia  -Monitor, 10/17/24: predominantly SR - one episode of WCT (around 100bpm), which terminates with sinus pause - short episodes of NCT which appears to be likely SVT   -Echo, 10/23/24: EF 61-65%, no significant valvular disease  -s/p AVNRT ablatoin with sp modification  -overall " doing well  -follow up as needed; if has recurring arrhythmia, referred to Sandie Rodriguez or Rashawn Peraza in [facility].       Hypertension  -Well controlled, continue current medication regimen      Follow Up  Return if symptoms worsen or fail to improve.      Thank you for allowing me to participate in the care of your patient. Please to not hesitate to contact me with additional questions or concerns.        Jaison Christopher MD [facility]  Cardiac Electrophysiologist  [facility]

## (undated) DEVICE — ADULT, W/LG. BACK PAD, RADIOTRANSPARENT ELEMENT AND LEAD WIRE COMPATIBLE W/: Brand: DEFIBRILLATION ELECTRODES

## (undated) DEVICE — PERCLOSE™ PROSTYLE™ SUTURE-MEDIATED CLOSURE AND REPAIR SYSTEM: Brand: PERCLOSE™ PROSTYLE™

## (undated) DEVICE — PINNACLE INTRODUCER SHEATH: Brand: PINNACLE

## (undated) DEVICE — PAD GRND REM POLYHESIVE A/ DISP

## (undated) DEVICE — SET PRIMARY GRVTY 10DP MALE LL 104IN

## (undated) DEVICE — LIMB HOLDER, WRIST/ANKLE: Brand: DEROYAL

## (undated) DEVICE — Device: Brand: WEBSTER CS

## (undated) DEVICE — SOL NACL 0.9PCT 1000ML

## (undated) DEVICE — DRSNG SURESITE123 4X4.8IN

## (undated) DEVICE — CATH EP SUPRM QUADPOLAR JSN 5F 5MM 120CM

## (undated) DEVICE — CATH EP SUPREME QUADPOLAR CRD2 5F 5MM 120CM

## (undated) DEVICE — CATH ABL TACTIFLEX BIDIR IRR D/J/CRV 8F 115CM 2/2/2MM 4MM/TP

## (undated) DEVICE — ST INF PRI SMRTSTE 20DRP 2VLV 24ML 117

## (undated) DEVICE — LEX ELECTRO PHYSIOLOGY: Brand: MEDLINE INDUSTRIES, INC.

## (undated) DEVICE — KT ELECTRD EP SURF ENSITE/X ADHS/PTCH 1P/U NS

## (undated) DEVICE — CANN NASL CAPNOFLEX SMPL CO2/O2 W/O2/DEL A/

## (undated) DEVICE — ST EXT IV SMRTSTE 2VLV FIX M LL 6ML 41

## (undated) DEVICE — DECANT BG O JET